# Patient Record
Sex: FEMALE
[De-identification: names, ages, dates, MRNs, and addresses within clinical notes are randomized per-mention and may not be internally consistent; named-entity substitution may affect disease eponyms.]

---

## 2017-06-10 ENCOUNTER — HEALTH MAINTENANCE LETTER (OUTPATIENT)
Age: 47
End: 2017-06-10

## 2017-11-17 ENCOUNTER — HOSPITAL ENCOUNTER (EMERGENCY)
Facility: CLINIC | Age: 47
Discharge: HOME | End: 2017-11-17
Attending: INTERNAL MEDICINE | Admitting: INTERNAL MEDICINE
Payer: MEDICARE

## 2017-11-17 ENCOUNTER — APPOINTMENT (OUTPATIENT)
Dept: MRI IMAGING | Facility: CLINIC | Age: 47
End: 2017-11-17
Attending: INTERNAL MEDICINE
Payer: MEDICARE

## 2017-11-17 VITALS
TEMPERATURE: 98.3 F | DIASTOLIC BLOOD PRESSURE: 100 MMHG | RESPIRATION RATE: 16 BRPM | SYSTOLIC BLOOD PRESSURE: 157 MMHG | OXYGEN SATURATION: 100 %

## 2017-11-17 DIAGNOSIS — H53.002 LAZY EYE OF LEFT SIDE: ICD-10-CM

## 2017-11-17 DIAGNOSIS — G51.0 LEFT-SIDED BELL'S PALSY: ICD-10-CM

## 2017-11-17 PROCEDURE — 25000128 H RX IP 250 OP 636: Performed by: INTERNAL MEDICINE

## 2017-11-17 PROCEDURE — 99291 CRITICAL CARE FIRST HOUR: CPT | Mod: Z6 | Performed by: INTERNAL MEDICINE

## 2017-11-17 PROCEDURE — 70549 MR ANGIOGRAPH NECK W/O&W/DYE: CPT

## 2017-11-17 PROCEDURE — A9585 GADOBUTROL INJECTION: HCPCS | Performed by: INTERNAL MEDICINE

## 2017-11-17 PROCEDURE — 70553 MRI BRAIN STEM W/O & W/DYE: CPT

## 2017-11-17 PROCEDURE — 99285 EMERGENCY DEPT VISIT HI MDM: CPT | Mod: 25

## 2017-11-17 RX ORDER — GADOBUTROL 604.72 MG/ML
7.5 INJECTION INTRAVENOUS ONCE
Status: COMPLETED | OUTPATIENT
Start: 2017-11-17 | End: 2017-11-17

## 2017-11-17 RX ADMIN — GADOBUTROL 7.5 ML: 604.72 INJECTION INTRAVENOUS at 15:57

## 2017-11-17 RX ADMIN — SODIUM CHLORIDE 100 ML: 9 INJECTION, SOLUTION INTRAVENOUS at 15:57

## 2017-11-17 ASSESSMENT — ENCOUNTER SYMPTOMS
SHORTNESS OF BREATH: 0
ABDOMINAL PAIN: 0
FEVER: 0

## 2017-11-17 NOTE — ED AVS SNAPSHOT
[facility], [place], Emergency Department    [address] [place] [id number]    Phone:  [phone]                                       Ileana Reyes   MRN: [id number]    Department:  [facility], Emergency Department   Date of Visit:  11/17/2017           After Visit Summary Signature Page     I have received my discharge instructions, and my questions have been answered. I have discussed any challenges I see with this plan with the nurse or doctor.    ..........................................................................................................................................  Patient/Patient Representative Signature      ..........................................................................................................................................  Patient Representative Print Name and Relationship to Patient    ..................................................               ................................................  Date                                            Time    ..........................................................................................................................................  Reviewed by Signature/Title    ...................................................              ..............................................  Date                                                            Time

## 2017-11-17 NOTE — ED AVS SNAPSHOT
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        Discharge Instructions         Stein s Palsy    Bell's Palsy is a problem involving the nerve that controls the muscles on one side of the face.  The cause is unknown, but may be related to inflammation of the nerve. Symptoms usually appear only on the affected side. They may include:    Inability to close the eyelid    Tearing of the eye    Facial drooping    Drooling    Numbness or pain    Changes in taste    Sound sensitivity  Damage to the eye can be a serious problem. The inability to blink can cause the eye to dry out. An ulcer (sore) can then form on the cornea. Also, not blinking means that the eye has no protection from dirt and dust particles.  Treatment involves protecting and moistening the eye. Medicines may also help.  Most persons recover completely within 3 to 6 months. However, the condition sometimes returns months or years later.  Home care    Get plenty of rest and eat a healthy diet to help yourself recover.    Use Artificial Tears frequently during the day and at bedtime to prevent drying. These drops are available without prescription at your drug store.    Wear protective glasses especially when outside to protect from flying debris. Use sunglasses when outdoors.    Tape the eyelid closed at bedtime with a paper tape (available at your pharmacy). This has a very mild adhesive to avoid injury to the lid. This will protect your eye from injury while you sleep.    Sometimes medicines are prescribed to reduce inflammation or treat  specific viral infections of the nerve. If medicines are prescribed, take them exactly as directed. Usually there is a 10-day course of medication that is started as soon as possible. Taking this medicine as prescribed will help with a full recovery.    Use low heat, for example from a heating pad, on the affected area. This can help reduce pain and swelling.    If you are experiencing sever pain, contact your health care provider.  Follow-up care  Follow up with your healthcare provider as advised. If you referred to a specialist, make that appointment promptly.  When to seek medical advice  Call your healthcare provider if any of the following occur:    Severe eye redness    Eye pain    Thick drainage from the eye    Change in vision (such as double vision or losing vision)    Fever over 100.4 F (38 C) or as directed by your healthcare provider    Headache, neck pain, weakness, trouble speaking or walking, or other unexplained symptoms  Date Last Reviewed: 8/23/2015 2000-2017 [employer]. [address] [place] [address]. All rights reserved. This information is not intended as a substitute for professional medical care. Always follow your healthcare professional's instructions.      Please make an appointment to follow up with Your Primary Care Provider in 10-20 days even if entirely better.     24 Hour Appointment Hotline       To make an appointment at any [facility], call [phone] ([phone]). If you don't have a family doctor or clinic, we will help you find one. [facility] clinics are conveniently located to serve the needs of you and your family.             Review of your medicines      Our records show that you are taking the medicines listed below. If these are incorrect, please call your family doctor or clinic.        Dose / Directions Last dose taken    amitriptyline 25 MG tablet   Commonly known as:  ELAVIL   Dose:  25 mg   Quantity:  60 tablet        Take 1  "tablet (25 mg) by mouth At Bedtime   Refills:  0        IBUPROFEN PO   Dose:  600 mg        Take 600 mg by mouth every 8 hours as needed for moderate pain   Refills:  0                Procedures and tests performed during your visit     MR Brain for Stroke Complete (UU,UA,SH)      Orders Needing Specimen Collection     None      Pending Results     No orders found from 11/15/2017 to 2017.            Pending Culture Results     No orders found from 11/15/2017 to 2017.            Pending Results Instructions     If you had any lab results that were not finalized at the time of your Discharge, you can call the ED Lab Result RN at [phone]. You will be contacted by this team for any positive Lab results or changes in treatment. The nurses are available 7 days a week from 10A to 6:30P.  You can leave a message 24 hours per day and they will return your call.        Thank you for choosing [facility]       Thank you for choosing [facility] for your care. Our goal is always to provide you with excellent care. Hearing back from our patients is one way we can continue to improve our services. Please take a few minutes to complete the written survey that you may receive in the mail after you visit with us. Thank you!        [employer] Information     [employer] lets you send messages to your doctor, view your test results, renew your prescriptions, schedule appointments and more. To sign up, go to [email or web address] . Click on \"Log in\" on the left side of the screen, which will take you to the Welcome page. Then click on \"Sign up Now\" on the right side of the page.     You will be asked to enter the access code listed below, as well as some personal information. Please follow the directions to create your username and password.     Your access code is: [email or web address]  Expires: 2/15/2018  4:38 PM     Your access code will  in 90 days. If you need help or a new code, please call your [facility] clinic or " [phone].        Care EveryWhere ID     This is your Care EveryWhere ID. This could be used by other organizations to access your [facility] medical records  [id number]        Equal Access to Services     VINAYAK CASTILLO : Zi Garcia, jin mcnulty, samantasiena kapriyacuate thibodeaux, clint bey. So [facility] [phone].    ATENCIÓN: Si habla español, tiene a lugo disposición servicios gratuitos de asistencia lingüística. Llame al [phone].    We comply with applicable federal civil rights laws and [place] laws. We do not discriminate on the basis of race, color, national origin, age, disability, sex, sexual orientation, or gender identity.            After Visit Summary       This is your record. Keep this with you and show to your community pharmacist(s) and doctor(s) at your next visit.

## 2017-11-17 NOTE — DISCHARGE INSTRUCTIONS
Stein s Palsy    Bell's Palsy is a problem involving the nerve that controls the muscles on one side of the face.  The cause is unknown, but may be related to inflammation of the nerve. Symptoms usually appear only on the affected side. They may include:    Inability to close the eyelid    Tearing of the eye    Facial drooping    Drooling    Numbness or pain    Changes in taste    Sound sensitivity  Damage to the eye can be a serious problem. The inability to blink can cause the eye to dry out. An ulcer (sore) can then form on the cornea. Also, not blinking means that the eye has no protection from dirt and dust particles.  Treatment involves protecting and moistening the eye. Medicines may also help.  Most persons recover completely within 3 to 6 months. However, the condition sometimes returns months or years later.  Home care    Get plenty of rest and eat a healthy diet to help yourself recover.    Use Artificial Tears frequently during the day and at bedtime to prevent drying. These drops are available without prescription at your drug store.    Wear protective glasses especially when outside to protect from flying debris. Use sunglasses when outdoors.    Tape the eyelid closed at bedtime with a paper tape (available at your pharmacy). This has a very mild adhesive to avoid injury to the lid. This will protect your eye from injury while you sleep.    Sometimes medicines are prescribed to reduce inflammation or treat specific viral infections of the nerve. If medicines are prescribed, take them exactly as directed. Usually there is a 10-day course of medication that is started as soon as possible. Taking this medicine as prescribed will help with a full recovery.    Use low heat, for example from a heating pad, on the affected area. This can help reduce pain and swelling.    If you are experiencing sever pain, contact your health care provider.  Follow-up care  Follow up with your healthcare provider as advised.  If you referred to a specialist, make that appointment promptly.  When to seek medical advice  Call your healthcare provider if any of the following occur:    Severe eye redness    Eye pain    Thick drainage from the eye    Change in vision (such as double vision or losing vision)    Fever over 100.4 F (38 C) or as directed by your healthcare provider    Headache, neck pain, weakness, trouble speaking or walking, or other unexplained symptoms  Date Last Reviewed: 8/23/2015 2000-2017 [employer]. [address] [place] [address]. All rights reserved. This information is not intended as a substitute for professional medical care. Always follow your healthcare professional's instructions.      Please make an appointment to follow up with Your Primary Care Provider in 10-20 days even if entirely better.

## 2022-12-31 NOTE — ED NOTES
"Pt comes to ED with . Pt states left eye feels dry (normal for left eye to drift outwards) and the left side of her face is swollen around her jaw. Swelling started last Thursday. Face is asymmetrical related to swelling. Pt spoke with PCP and was told to come to ED \"to check for nerve damage in face\".     Pt denies vision change, tooth pain, weakness, or difficulty swallowing. Pt states speech has changed due to swelling but pt is still able to speak clearly.     " TM reporting + test via  [employer].  Epic banner shows a + test on 12-31-22. TM fully vaccinated.  TM and manager emailed + letter.  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